# Patient Record
Sex: MALE | Race: BLACK OR AFRICAN AMERICAN | URBAN - METROPOLITAN AREA
[De-identification: names, ages, dates, MRNs, and addresses within clinical notes are randomized per-mention and may not be internally consistent; named-entity substitution may affect disease eponyms.]

---

## 2019-10-01 ENCOUNTER — OFFICE VISIT (OUTPATIENT)
Dept: URGENT CARE | Facility: URGENT CARE | Age: 29
End: 2019-10-01
Payer: COMMERCIAL

## 2019-10-01 VITALS
TEMPERATURE: 99 F | HEART RATE: 91 BPM | WEIGHT: 277 LBS | RESPIRATION RATE: 18 BRPM | OXYGEN SATURATION: 97 % | SYSTOLIC BLOOD PRESSURE: 129 MMHG | DIASTOLIC BLOOD PRESSURE: 87 MMHG

## 2019-10-01 DIAGNOSIS — L02.411 ABSCESS OF AXILLA, RIGHT: ICD-10-CM

## 2019-10-01 DIAGNOSIS — L73.2 SUPPURATIVE HIDRADENITIS: Primary | ICD-10-CM

## 2019-10-01 DIAGNOSIS — L03.111 CELLULITIS OF AXILLA, RIGHT: ICD-10-CM

## 2019-10-01 PROCEDURE — 99203 OFFICE O/P NEW LOW 30 MIN: CPT | Performed by: PHYSICIAN ASSISTANT

## 2019-10-01 RX ORDER — DOXYCYCLINE 100 MG/1
100 CAPSULE ORAL 2 TIMES DAILY
Qty: 28 CAPSULE | Refills: 0 | Status: SHIPPED | OUTPATIENT
Start: 2019-10-01 | End: 2019-10-15

## 2019-10-02 NOTE — PROGRESS NOTES
SUBJECTIVE:  Donnie Bray is a 29 year old male who presents to the clinic today for skin infection.  One week ago he developed a boil in his right armpit. He has a hx of hidradenitis supprativa but lesions usually drain on their own and spontaneously resolve.  This one has not gone away yet. He denies fevers or chills. Unsure if he has diabetes as he hasn't been to a PCP in some time.  He has never been to a dermatologist.  He has no hx of MRSA.      EXAM:   /87 (BP Location: Left arm, Patient Position: Chair, Cuff Size: Adult Large)   Pulse 91   Temp 99  F (37.2  C) (Oral)   Resp 18   Wt 125.6 kg (277 lb)   SpO2 97%   GENERAL: alert, no acute distress.  SKIN: Rash description:    Distribution: localized  Location: axilla- right  Right axilla with 3cm abscess with extending erythema that is indurated approximately 8cm.    ASSESSMENT:  (L73.2) Suppurative hidradenitis  (primary encounter diagnosis)  (L02.411) Abscess of axilla, right  (L03.111) Cellulitis of axilla, right      PLAN:  1. Take doxycycline 100mg BID x 10 days  2. Apply hot compress  3. Follow-up PCP in 1-2 days for I+D  4. Follow-up DERM for management of hidradenitis supprativa
